# Patient Record
Sex: FEMALE | Race: WHITE | Employment: UNEMPLOYED | URBAN - METROPOLITAN AREA
[De-identification: names, ages, dates, MRNs, and addresses within clinical notes are randomized per-mention and may not be internally consistent; named-entity substitution may affect disease eponyms.]

---

## 2018-01-29 PROBLEM — Z01.00 EMMETROPIA: Status: ACTIVE | Noted: 2018-01-29

## 2018-01-29 PROBLEM — E11.3313 MODERATE NONPROLIFERATIVE DIABETIC RETINOPATHY OF BOTH EYES WITH MACULAR EDEMA ASSOCIATED WITH TYPE 2 DIABETES MELLITUS (HCC): Status: ACTIVE | Noted: 2018-01-29

## 2018-01-31 ENCOUNTER — TELEPHONE (OUTPATIENT)
Dept: FAMILY MEDICINE CLINIC | Facility: CLINIC | Age: 48
End: 2018-01-31

## 2018-02-01 ENCOUNTER — OFFICE VISIT (OUTPATIENT)
Dept: FAMILY MEDICINE CLINIC | Facility: CLINIC | Age: 48
End: 2018-02-01

## 2018-02-01 VITALS
HEART RATE: 76 BPM | SYSTOLIC BLOOD PRESSURE: 122 MMHG | RESPIRATION RATE: 12 BRPM | DIASTOLIC BLOOD PRESSURE: 74 MMHG | HEIGHT: 61 IN | WEIGHT: 228 LBS | BODY MASS INDEX: 43.05 KG/M2 | TEMPERATURE: 98 F

## 2018-02-01 DIAGNOSIS — Z23 NEED FOR VACCINATION: ICD-10-CM

## 2018-02-01 DIAGNOSIS — I83.90 VARICOSE VEIN OF LEG: ICD-10-CM

## 2018-02-01 DIAGNOSIS — E78.5 DYSLIPIDEMIA: ICD-10-CM

## 2018-02-01 DIAGNOSIS — Z00.00 ROUTINE HEALTH MAINTENANCE: Primary | ICD-10-CM

## 2018-02-01 DIAGNOSIS — Z79.4 TYPE 2 DIABETES MELLITUS WITH HYPERGLYCEMIA, WITH LONG-TERM CURRENT USE OF INSULIN (HCC): ICD-10-CM

## 2018-02-01 DIAGNOSIS — Z12.31 VISIT FOR SCREENING MAMMOGRAM: ICD-10-CM

## 2018-02-01 DIAGNOSIS — E06.3 HASHIMOTO'S THYROIDITIS: ICD-10-CM

## 2018-02-01 DIAGNOSIS — E11.65 TYPE 2 DIABETES MELLITUS WITH HYPERGLYCEMIA, WITH LONG-TERM CURRENT USE OF INSULIN (HCC): ICD-10-CM

## 2018-02-01 DIAGNOSIS — K21.9 GASTROESOPHAGEAL REFLUX DISEASE WITHOUT ESOPHAGITIS: ICD-10-CM

## 2018-02-01 DIAGNOSIS — E11.3313 MODERATE NONPROLIFERATIVE DIABETIC RETINOPATHY OF BOTH EYES WITH MACULAR EDEMA ASSOCIATED WITH TYPE 2 DIABETES MELLITUS (HCC): ICD-10-CM

## 2018-02-01 DIAGNOSIS — N64.4 BREAST PAIN, LEFT: ICD-10-CM

## 2018-02-01 PROCEDURE — 90686 IIV4 VACC NO PRSV 0.5 ML IM: CPT | Performed by: FAMILY MEDICINE

## 2018-02-01 PROCEDURE — 99203 OFFICE O/P NEW LOW 30 MIN: CPT | Performed by: FAMILY MEDICINE

## 2018-02-01 PROCEDURE — 90471 IMMUNIZATION ADMIN: CPT | Performed by: FAMILY MEDICINE

## 2018-02-01 PROCEDURE — 99386 PREV VISIT NEW AGE 40-64: CPT | Performed by: FAMILY MEDICINE

## 2018-02-01 RX ORDER — LEVOTHYROXINE SODIUM 0.2 MG/1
200 TABLET ORAL
COMMUNITY

## 2018-02-01 NOTE — PATIENT INSTRUCTIONS
Dr. Trinh Welch or Dr. Jonelle Hernandez.  [address] [facility] [address] [facility], [address]  Phone: [phone]  Fax: [phone]      Dr. Marta Lo  Address: [address] [place] [address], [address], [facility], [address]  Phone: [phone]

## 2018-02-01 NOTE — PROGRESS NOTES
SUBJECTIVE:  Patient presents with:  Establish Care: New patient   Physical: no     HPI:  Patient presents to establish care today and for physical.  Notes she has several medical conditions and has not been to her physician in over 6 months.     Diabetes: Results  Component Value Date   HDL 38 (L) 03/15/2016       Lab Results  Component Value Date    (H) 03/15/2016     Depression screen: Notes that she is having harder issues caring for her mother; increased stress  Cervical Cancer screening: S/p kaminis SURGERY      Comment: umbilical hernia repair  6/12/2006: HYSTERECTOMY      Comment: total and [facility]SO-hospitalized for pos op wound                infection  No date: OOPHORECTOMY  2006: OTHER SURGICAL HISTORY      Comment: ERCP  8/4/2006: UPPER GI ENDOSCOPY, is a 50year old female is here for Establish Care (New patient ) and Physical (no )    Problem List Items Addressed This Visit        Endocrine    Type 2 diabetes mellitus with hyperglycemia ([facility] [address]    Relevant Orders    HEMOGLOBIN A1C    MICROALB/CREAT RAT left  Suspicious for developing shingles vs neuropathy. Will cont to monitor at this omid    Varicose veins, L leg:  Referral given for evaluation of veins.     Routine health maintenance  -     COMP METABOLIC PANEL (14)  -     CBC WITH DIFFERENTIAL WITH MARY

## 2018-02-05 ENCOUNTER — TELEPHONE (OUTPATIENT)
Dept: FAMILY MEDICINE CLINIC | Facility: CLINIC | Age: 48
End: 2018-02-05

## 2018-02-05 DIAGNOSIS — Z79.4 ENCOUNTER FOR LONG-TERM (CURRENT) USE OF INSULIN (HCC): ICD-10-CM

## 2018-02-05 DIAGNOSIS — Z00.00 ROUTINE GENERAL MEDICAL EXAMINATION AT A HEALTH CARE FACILITY: ICD-10-CM

## 2018-02-05 DIAGNOSIS — I83.811 VARICOSE VEINS OF LEG WITH PAIN, RIGHT: Primary | ICD-10-CM

## 2018-02-05 DIAGNOSIS — Z79.4 TYPE 2 DIABETES MELLITUS WITH HYPERGLYCEMIA, WITH LONG-TERM CURRENT USE OF INSULIN (HCC): Primary | ICD-10-CM

## 2018-02-05 DIAGNOSIS — E11.65 TYPE 2 DIABETES MELLITUS WITH HYPERGLYCEMIA, WITH LONG-TERM CURRENT USE OF INSULIN (HCC): Primary | ICD-10-CM

## 2018-02-05 DIAGNOSIS — E78.5 DYSLIPIDEMIA: ICD-10-CM

## 2018-02-05 DIAGNOSIS — E04.1 THYROID NODULE: ICD-10-CM

## 2018-02-05 DIAGNOSIS — E06.3 HASHIMOTO'S THYROIDITIS: ICD-10-CM

## 2018-02-05 NOTE — TELEPHONE ENCOUNTER
Pt requesting med for painful itchy skin. Aris Oconnell tried cortizone cream but not helping. Was in w/Dr Imelda Mcclain on 2/1/18

## 2018-02-05 NOTE — TELEPHONE ENCOUNTER
LOV 2/1/18. Pt states that she has itchy skin on back, axilla and breast. She states that there is no rash. She states that she showed Dr Angela Silveira at [address] [place] [address]. Dr Angela Silveira is out of the office today. Offered appt, but pt declined.  She states that she wants to wa

## 2018-02-05 NOTE — TELEPHONE ENCOUNTER
I would recommend starting with benadryl or we can try hydroxyzine for the itching. Cont with cortisone.  No new medications, supplements, etc?  Phylicia Chamorro, DO

## 2018-02-05 NOTE — TELEPHONE ENCOUNTER
Reviewed OV note from Dr Cha Shukla 2/1/18, doctor did mention referral for varicose vein evaluation, however a referral was not written.    Referral PENDING for Dr Cha Shukla

## 2018-02-07 NOTE — TELEPHONE ENCOUNTER
Pt states that the itchy skin persists. She has recently started taking a liquid Omega 3. Advised that she stop taking that to see if it makes a difference. She is also going to try Benadryl for a couple of days. She will call back if itchiness persists.

## 2018-02-12 ENCOUNTER — DIABETIC EDUCATION (OUTPATIENT)
Dept: ENDOCRINOLOGY CLINIC | Facility: CLINIC | Age: 48
End: 2018-02-12

## 2018-02-12 DIAGNOSIS — E11.3313 MODERATE NONPROLIFERATIVE DIABETIC RETINOPATHY OF BOTH EYES WITH MACULAR EDEMA ASSOCIATED WITH TYPE 2 DIABETES MELLITUS (HCC): Primary | ICD-10-CM

## 2018-02-12 DIAGNOSIS — E11.65 TYPE 2 DIABETES MELLITUS WITH HYPERGLYCEMIA, WITH LONG-TERM CURRENT USE OF INSULIN (HCC): Primary | ICD-10-CM

## 2018-02-12 DIAGNOSIS — Z79.4 TYPE 2 DIABETES MELLITUS WITH HYPERGLYCEMIA, WITH LONG-TERM CURRENT USE OF INSULIN (HCC): Primary | ICD-10-CM

## 2018-02-12 DIAGNOSIS — K21.9 GASTROESOPHAGEAL REFLUX DISEASE WITHOUT ESOPHAGITIS: ICD-10-CM

## 2018-02-12 PROCEDURE — 97802 MEDICAL NUTRITION INDIV IN: CPT | Performed by: DIETITIAN, REGISTERED

## 2018-02-12 RX ORDER — OMEPRAZOLE 20 MG/1
20 CAPSULE, DELAYED RELEASE ORAL 2 TIMES DAILY
Qty: 60 CAPSULE | Refills: 2 | Status: SHIPPED | OUTPATIENT
Start: 2018-02-12

## 2018-02-12 NOTE — TELEPHONE ENCOUNTER
Patient is requesting refills on omeprazole 20 MG Oral Capsule Delayed Release,Insulin Pen Needle (BD PEN NEEDLE MINI U/F) 31G X 5 MM Does not apply Misc, and LEVEMIR FLEXTOUCH 100 UNIT/ML Subcutaneous Solution Pen-injector sent to   [place] [phone]

## 2018-02-12 NOTE — TELEPHONE ENCOUNTER
Please remind patient to get her labs done as this may change her current dosing of insulin.   Thanks,  Mayank Ramirez, DO

## 2018-02-12 NOTE — PROGRESS NOTES
Jeremy Meir RODRIGUEZ [id number] was seen for Diabetic Medical Nutrition Therapy:    Date: 2018  Start time: 9:00 End time: 10:00    HEMOGLOBIN A1C (%)   Date Value   03/15/2016 11.4 (H)   ----------    Reviewed HgbA1C and average glucose level as wel gets up to have a few crackers and then feels better. Doesn't sleep well, worried about sounds in the house may be her mom downstairs.   Thank you for the referral.  Maria Dolores Rosario MS, RD, CDE, LDN

## 2018-02-12 NOTE — PROGRESS NOTES
Pt did not tolerate glimepiride in the past, states she couldn't breath. Was afraid of the commercials she saw about [employer]ok[employer].

## 2018-02-13 ENCOUNTER — HOSPITAL ENCOUNTER (OUTPATIENT)
Dept: MAMMOGRAPHY | Age: 48
Discharge: HOME | End: 2018-02-13
Attending: FAMILY MEDICINE
Payer: COMMERCIAL

## 2018-02-13 ENCOUNTER — APPOINTMENT (OUTPATIENT)
Dept: LAB | Age: 48
End: 2018-02-13
Attending: FAMILY MEDICINE
Payer: COMMERCIAL

## 2018-02-13 DIAGNOSIS — E78.5 DYSLIPIDEMIA: ICD-10-CM

## 2018-02-13 DIAGNOSIS — E04.1 THYROID NODULE: ICD-10-CM

## 2018-02-13 DIAGNOSIS — Z12.31 VISIT FOR SCREENING MAMMOGRAM: ICD-10-CM

## 2018-02-13 DIAGNOSIS — Z00.00 ROUTINE GENERAL MEDICAL EXAMINATION AT A HEALTH CARE FACILITY: ICD-10-CM

## 2018-02-13 DIAGNOSIS — Z79.4 ENCOUNTER FOR LONG-TERM (CURRENT) USE OF INSULIN (HCC): ICD-10-CM

## 2018-02-13 DIAGNOSIS — Z79.4 TYPE 2 DIABETES MELLITUS WITH HYPERGLYCEMIA, WITH LONG-TERM CURRENT USE OF INSULIN (HCC): ICD-10-CM

## 2018-02-13 DIAGNOSIS — E11.65 TYPE 2 DIABETES MELLITUS WITH HYPERGLYCEMIA, WITH LONG-TERM CURRENT USE OF INSULIN (HCC): ICD-10-CM

## 2018-02-13 DIAGNOSIS — E06.3 HASHIMOTO'S THYROIDITIS: ICD-10-CM

## 2018-02-13 LAB
ALBUMIN SERPL-MCNC: 3.6 G/DL (ref 3.5–4.8)
ALP LIVER SERPL-CCNC: 110 U/L (ref 39–100)
ALT SERPL-CCNC: 26 U/L (ref 14–54)
AST SERPL-CCNC: 10 U/L (ref 15–41)
BASOPHILS # BLD AUTO: 0.07 X10(3) UL (ref 0–0.1)
BASOPHILS NFR BLD AUTO: 1 %
BILIRUB SERPL-MCNC: 0.7 MG/DL (ref 0.1–2)
BUN BLD-MCNC: 15 MG/DL (ref 8–20)
CALCIUM BLD-MCNC: 9.1 MG/DL (ref 8.3–10.3)
CHLORIDE: 102 MMOL/L (ref 101–111)
CHOLEST SMN-MCNC: 195 MG/DL (ref ?–200)
CO2: 27 MMOL/L (ref 22–32)
CREAT BLD-MCNC: 0.84 MG/DL (ref 0.55–1.02)
CREAT UR-SCNC: 76.9 MG/DL
EOSINOPHIL # BLD AUTO: 0.2 X10(3) UL (ref 0–0.3)
EOSINOPHIL NFR BLD AUTO: 3 %
ERYTHROCYTE [DISTWIDTH] IN BLOOD BY AUTOMATED COUNT: 13.5 % (ref 11.5–16)
EST. AVERAGE GLUCOSE BLD GHB EST-MCNC: 318 MG/DL (ref 68–126)
GLUCOSE BLD-MCNC: 363 MG/DL (ref 70–99)
HBA1C MFR BLD HPLC: 12.7 % (ref ?–5.7)
HCT VFR BLD AUTO: 44.9 % (ref 34–50)
HDLC SERPL-MCNC: 34 MG/DL (ref 45–?)
HDLC SERPL: 5.74 {RATIO} (ref ?–4.44)
HGB BLD-MCNC: 14.3 G/DL (ref 12–16)
IMMATURE GRANULOCYTE COUNT: 0.02 X10(3) UL (ref 0–1)
IMMATURE GRANULOCYTE RATIO %: 0.3 %
LDLC SERPL CALC-MCNC: 114 MG/DL (ref ?–130)
LYMPHOCYTES # BLD AUTO: 1.54 X10(3) UL (ref 0.9–4)
LYMPHOCYTES NFR BLD AUTO: 22.9 %
M PROTEIN MFR SERPL ELPH: 7.3 G/DL (ref 6.1–8.3)
MCH RBC QN AUTO: 29 PG (ref 27–33.2)
MCHC RBC AUTO-ENTMCNC: 31.8 G/DL (ref 31–37)
MCV RBC AUTO: 91.1 FL (ref 81–100)
MICROALBUMIN UR-MCNC: 0.61 MG/DL
MICROALBUMIN/CREAT 24H UR-RTO: 7.9 UG/MG (ref ?–30)
MONOCYTES # BLD AUTO: 0.41 X10(3) UL (ref 0.1–1)
MONOCYTES NFR BLD AUTO: 6.1 %
NEUTROPHIL ABS PRELIM: 4.49 X10 (3) UL (ref 1.3–6.7)
NEUTROPHILS # BLD AUTO: 4.49 X10(3) UL (ref 1.3–6.7)
NEUTROPHILS NFR BLD AUTO: 66.7 %
NONHDLC SERPL-MCNC: 161 MG/DL (ref ?–130)
PLATELET # BLD AUTO: 271 10(3)UL (ref 150–450)
POTASSIUM SERPL-SCNC: 4.1 MMOL/L (ref 3.6–5.1)
RBC # BLD AUTO: 4.93 X10(6)UL (ref 3.8–5.1)
RED CELL DISTRIBUTION WIDTH-SD: 45.7 FL (ref 35.1–46.3)
SODIUM SERPL-SCNC: 137 MMOL/L (ref 136–144)
TRIGL SERPL-MCNC: 234 MG/DL (ref ?–150)
TSI SER-ACNC: 8.32 MIU/ML (ref 0.35–5.5)
VLDLC SERPL CALC-MCNC: 47 MG/DL (ref 5–40)
WBC # BLD AUTO: 6.7 X10(3) UL (ref 4–13)

## 2018-02-13 PROCEDURE — 77067 SCR MAMMO BI INCL CAD: CPT | Performed by: FAMILY MEDICINE

## 2018-02-17 ENCOUNTER — TELEPHONE (OUTPATIENT)
Dept: FAMILY MEDICINE CLINIC | Facility: CLINIC | Age: 48
End: 2018-02-17

## 2018-02-17 NOTE — TELEPHONE ENCOUNTER
PLEASE NOTE ERROR ON RESULTS MESSAGE.  Patient should increase her insulin by 2 units every 2-3 days to achieve fasting glucose of <150 in the AM.   Thanks,  Phylicia Chamorro DO

## 2018-02-19 ENCOUNTER — TELEPHONE (OUTPATIENT)
Dept: FAMILY MEDICINE CLINIC | Facility: CLINIC | Age: 48
End: 2018-02-19

## 2018-02-19 DIAGNOSIS — E78.5 DYSLIPIDEMIA: Primary | ICD-10-CM

## 2018-02-19 DIAGNOSIS — E06.3 HASHIMOTO'S THYROIDITIS: ICD-10-CM

## 2018-02-19 RX ORDER — LEVOTHYROXINE SODIUM 0.05 MG/1
50 TABLET ORAL
Qty: 30 TABLET | Refills: 5 | Status: SHIPPED | OUTPATIENT
Start: 2018-02-19

## 2018-02-19 RX ORDER — ROSUVASTATIN CALCIUM 10 MG/1
10 TABLET, COATED ORAL NIGHTLY
Qty: 30 TABLET | Refills: 6 | Status: SHIPPED | OUTPATIENT
Start: 2018-02-19

## 2018-02-19 NOTE — TELEPHONE ENCOUNTER
patient given results of tests  PLEASE NOTE ERROR ON RESULTS MESSAGE.  Patient should increase her insulin by 2 units every 2-3 days to achieve fasting glucose of <150 in the AM.   Thanks,  Notes Recorded by Aliya Soares DO on 2/16/2018 at 11:30 PM CST

## 2018-02-19 NOTE — TELEPHONE ENCOUNTER
No metformin. Told Dr. Vivi Thomason she did not tolerate this. Will discuss at next visit  Thyroid - on 200mcg. Previously written by endocrine. Does she intend to continue to see them?   Next increase would be 250mcg if she wants us to prescribe this medicat

## 2018-02-19 NOTE — TELEPHONE ENCOUNTER
Called and talked to patient we will be following her for the thyroid problem from now on so sent in RX for 50 MCG tablet to take with the 200 mcg tablet.  On the Metformin the dietician told her that she should tolerate the Extended release better and want

## 2018-04-20 ENCOUNTER — TELEPHONE (OUTPATIENT)
Dept: FAMILY MEDICINE CLINIC | Facility: CLINIC | Age: 48
End: 2018-04-20

## 2018-04-20 NOTE — TELEPHONE ENCOUNTER
Patient is due for follow up on diabetes and chronic medical problems. Can we help her get scheduled?   Thanks,  Harrison Tate, DO